# Patient Record
Sex: FEMALE | Race: WHITE
[De-identification: names, ages, dates, MRNs, and addresses within clinical notes are randomized per-mention and may not be internally consistent; named-entity substitution may affect disease eponyms.]

---

## 2022-10-17 PROBLEM — Z00.00 ENCOUNTER FOR PREVENTIVE HEALTH EXAMINATION: Status: ACTIVE | Noted: 2022-10-17

## 2022-11-16 ENCOUNTER — APPOINTMENT (OUTPATIENT)
Dept: GERIATRICS | Facility: CLINIC | Age: 87
End: 2022-11-16

## 2022-11-16 VITALS
RESPIRATION RATE: 18 BRPM | HEART RATE: 79 BPM | BODY MASS INDEX: 33.96 KG/M2 | SYSTOLIC BLOOD PRESSURE: 160 MMHG | WEIGHT: 173 LBS | DIASTOLIC BLOOD PRESSURE: 98 MMHG | HEIGHT: 60 IN | TEMPERATURE: 97.7 F | OXYGEN SATURATION: 93 %
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
## 2022-11-16 DIAGNOSIS — M25.511 PAIN IN RIGHT SHOULDER: ICD-10-CM

## 2022-11-16 DIAGNOSIS — Z23 ENCOUNTER FOR IMMUNIZATION: ICD-10-CM
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
## 2022-11-16 DIAGNOSIS — Z87.891 PERSONAL HISTORY OF NICOTINE DEPENDENCE: ICD-10-CM

## 2022-11-16 PROCEDURE — 99205 OFFICE O/P NEW HI 60 MIN: CPT

## 2022-11-16 NOTE — PHYSICAL EXAM
[Healthy Appearing] : healthy appearing [Normal Voice/Communication] : normal voice/communication [Sclera] : the sclera and conjunctiva were normal [EOMI] : extraocular movements were intact [Normal Oral Mucosa] : normal oral mucosa [No Oral Pallor] : no oral pallor [No Oral Cyanosis] : no oral cyanosis [Normal Outer Ear/Nose] : the ears and nose were normal in appearance [Both Tympanic Membranes Were Examined] : both tympanic membranes were normal [Normal Lips/Gums] : the lips and gums were normal [Oropharynx] : the oropharynx was normal [Normal Appearance] : the appearance of the neck was normal [Supple] : the neck was supple [Murmurs] : no murmurs [Edema] : edema was not present [Normal] : normal bowel sounds, non-tender [Cervical Lymph Nodes Enlarged Posterior Bilaterally] : posterior cervical [Supraclavicular Lymph Nodes Enlarged Bilaterally] : supraclavicular [Cervical Lymph Nodes Enlarged Anterior Bilaterally] : anterior cervical, supraclavicular [No Spinal Tenderness] : no spinal tenderness [No Clubbing, Cyanosis] : no clubbing or cyanosis of the fingernails [Involuntary Movements] : no involuntary movements were seen [Normal Color / Pigmentation] : normal skin color and pigmentation [No Focal Deficits] : no focal deficits [Normal Affect] : the affect was normal [Normal Mood] : the mood was normal [de-identified] : right shoulder with pain with active extensionabove 40 degrees

## 2022-11-16 NOTE — HISTORY OF PRESENT ILLNESS
[One fall no injury in past year] : Patient reported one fall in the past year without injury [FreeTextEntry1] : 89yoM with CKDIV, unsteady gait, OA of knees, seen as new patient for geriatric consultation accompanied by her son Saran.\par \par functional status:\par fell out of bed onto right side last night with right shoulder pain on deltoid area, non radiating, mild, but worse with raising her arm\par walks with cane, or walker for longer distances\par \par she states she notcies that her memory has been getting worse, she can't remember things\par she has no history of cardiac or vascular disease.  she has no family history of dementia.\par active in the community\par independent with ADLs\par needs assistance with IADL's\par \par OA of knees\par takes Tylenol PRN\par \par takes no medication regularly\par \par wears glasses\par no hearing aides\par \par retired psychiatric nurse\par \par HCP:\par Saran Seay  son \par (has 2 sons)\par \par Lives in [facility] in [place]  [facility] in 10/20[place]\par goes to senior center few times\par does daily exercise class at [facility]\par \par denies sleeping disturbances\par \par occasional diarrhea\par denies UI\par \par had recent blood work 2-3 weeks ago\par \par got flu shot 2022\par

## 2022-11-16 NOTE — ASSESSMENT
[FreeTextEntry1] : Fall at home:\par right shoulder pain:\par unsteady gait:\par fall precautions discussed\par continue with daily exercise\par PRN tylenol, or topical pain creams\par continue with cane use\par if not improving or has limited ROM, recommend starting PT\par \par mild dementia:\par moca 19/30\par start aricept 5mg daily\par counselled on medication adherence, pill box, options within [facility] if assistance is needed\par cognitive stimulating activities, socialization, and exercise\par follow up with [facility] physician if previous imaging and additional lab work performed\par f/u 3months\par \par CKD IV:\par scr 1.73 \par no anemia 9/15/22\par avoid nephrotoxins and NSAIDs\par they will follow up if patient had US kidneys done with [facility] physician\par \par \par \par \par \par \par

## 2022-11-16 NOTE — REVIEW OF SYSTEMS
[Eyesight Problems] : eyesight problems [SOB on Exertion] : shortness of breath during exertion [As Noted in HPI] : as noted in HPI [Negative] : Heme/Lymph [Cough] : no cough

## 2023-01-04 ENCOUNTER — APPOINTMENT (OUTPATIENT)
Dept: GERIATRICS | Facility: CLINIC | Age: 88
End: 2023-01-04
Payer: MEDICARE

## 2023-01-04 ENCOUNTER — NON-APPOINTMENT (OUTPATIENT)
Age: 88
End: 2023-01-04

## 2023-01-04 VITALS
DIASTOLIC BLOOD PRESSURE: 64 MMHG | OXYGEN SATURATION: 96 % | WEIGHT: 176 LBS | SYSTOLIC BLOOD PRESSURE: 120 MMHG | HEIGHT: 60 IN | BODY MASS INDEX: 34.55 KG/M2 | HEART RATE: 69 BPM | RESPIRATION RATE: 14 BRPM | TEMPERATURE: 97.9 F

## 2023-01-04 DIAGNOSIS — R73.9 HYPERGLYCEMIA, UNSPECIFIED: ICD-10-CM

## 2023-01-04 PROCEDURE — 99214 OFFICE O/P EST MOD 30 MIN: CPT

## 2023-01-04 NOTE — HISTORY OF PRESENT ILLNESS
[FreeTextEntry1] : 89yoM with CKDIV, unsteady gait, OA of knees, seen for follow up accompanied by her son Saran.\par \par they are asking whether she needs metoprolol\par \par was placed on medication by prison doctor for elevated BP.\par but she has not been taking\par denies cardiac hx, palpications, cp, sob.\par she takes no medications regularly\par family is asking whether she really needs medication\par \par bp today is controlled on no medication\par \par ckd: denies previous imaging\par denies hx of stones, denies bleeding

## 2023-01-04 NOTE — ASSESSMENT
[FreeTextEntry1] : ckd: check labwork , renal US\par glucose 131 on prior labwork: check a1c\par Blood pressure controlled today-  advised no need for metoprolol for now, repeat bp in 1 month\par counselled on limiting salt intake\par mild dementia: continue to monitor off medications at this time\par \par f/u 1 month for BP, labwork, imaging

## 2023-01-04 NOTE — REVIEW OF SYSTEMS
[Fever] : no fever [As Noted in HPI] : as noted in HPI [Negative] : Neurological [FreeTextEntry9] : oa knees

## 2023-01-04 NOTE — PHYSICAL EXAM
[Normal Appearance] : the appearance of the neck was normal [No Clubbing, Cyanosis] : no clubbing or cyanosis of the fingernails [Involuntary Movements] : no involuntary movements were seen [Normal] : normal affect and normal mood [de-identified] : trace edema

## 2023-01-11 ENCOUNTER — LABORATORY RESULT (OUTPATIENT)
Age: 88
End: 2023-01-11

## 2023-01-13 ENCOUNTER — OUTPATIENT (OUTPATIENT)
Dept: OUTPATIENT SERVICES | Facility: HOSPITAL | Age: 88
LOS: 1 days | End: 2023-01-13
Payer: COMMERCIAL

## 2023-01-13 ENCOUNTER — TRANSCRIPTION ENCOUNTER (OUTPATIENT)
Age: 88
End: 2023-01-13

## 2023-01-13 ENCOUNTER — APPOINTMENT (OUTPATIENT)
Dept: ULTRASOUND IMAGING | Facility: HOSPITAL | Age: 88
End: 2023-01-13
Payer: MEDICARE

## 2023-01-13 DIAGNOSIS — N18.4 CHRONIC KIDNEY DISEASE, STAGE 4 (SEVERE): ICD-10-CM

## 2023-01-13 PROCEDURE — 76775 US EXAM ABDO BACK WALL LIM: CPT

## 2023-01-13 PROCEDURE — 76775 US EXAM ABDO BACK WALL LIM: CPT | Mod: 26

## 2023-01-16 ENCOUNTER — LABORATORY RESULT (OUTPATIENT)
Age: 88
End: 2023-01-16

## 2023-01-30 ENCOUNTER — APPOINTMENT (OUTPATIENT)
Dept: GERIATRICS | Facility: CLINIC | Age: 88
End: 2023-01-30
Payer: MEDICARE

## 2023-01-30 VITALS
BODY MASS INDEX: 34.16 KG/M2 | OXYGEN SATURATION: 98 % | SYSTOLIC BLOOD PRESSURE: 140 MMHG | WEIGHT: 174 LBS | HEART RATE: 81 BPM | HEIGHT: 60 IN | TEMPERATURE: 98.4 F | DIASTOLIC BLOOD PRESSURE: 78 MMHG | RESPIRATION RATE: 18 BRPM

## 2023-01-30 PROCEDURE — 99214 OFFICE O/P EST MOD 30 MIN: CPT

## 2023-01-30 NOTE — HISTORY OF PRESENT ILLNESS
[FreeTextEntry1] : 89yoM with CKDIV, unsteady gait, OA of knees, seen for follow up accompanied by her son Saran.\par \par labwork showing newly dx dm2\par states she eats a lot of candy and a sugary drinks\par \par bp today controlled\par \par she is without acute complaints\par \par notes trouble with her memory, which frustrates her\par she enjoys doing puzzles\par \par unsteady gait: uses cane or walker\par denies fall since last visit\par currently not working with PT\par \par

## 2023-01-30 NOTE — ASSESSMENT
[FreeTextEntry1] : CKDIV: renal US reviewed with patient\par baseline Cr ~1.7, no anemia\par discussed avoiding nephrotoxins\par \par DM2: newly diagnosed\par diet discussed\par no medication at this time\par \par vitamin b12 def:\par start B12 supplementation with injections x4 once weekly\par (they call to make apointment as she moving in with her son in a few weeks)\par \par mild Dementia:\par counselled on socialization, cognitive stimulating activities\par monitor off medications at this time\par MOCA 19/30 11/2022\par re-address starting aricept once patient is living with son\par \par unsteady gait: c/w cane and walker use

## 2023-01-30 NOTE — PHYSICAL EXAM
[Sclera] : the sclera and conjunctiva were normal [EOMI] : extraocular movements were intact [Normal Outer Ear/Nose] : the ears and nose were normal in appearance [Normal Appearance] : the appearance of the neck was normal [Normal] : no respiratory distress, no accessory muscle use, normal respiratory rhythm and effort, lungs were clear to auscultation bilaterally [Normal S1, S2] : normal S1 and S2 [Heart Rate And Rhythm] : heart rate was normal and rhythm regular [Edema] : edema was not present [Normal Color / Pigmentation] : normal skin color and pigmentation [No Focal Deficits] : no focal deficits [Normal Affect] : the affect was normal [Normal Mood] : the mood was normal [de-identified] : cane

## 2023-02-03 ENCOUNTER — APPOINTMENT (OUTPATIENT)
Dept: GERIATRICS | Facility: CLINIC | Age: 88
End: 2023-02-03

## 2023-03-06 ENCOUNTER — APPOINTMENT (OUTPATIENT)
Dept: GERIATRICS | Facility: CLINIC | Age: 88
End: 2023-03-06
Payer: MEDICARE

## 2023-03-06 PROCEDURE — 96372 THER/PROPH/DIAG INJ SC/IM: CPT

## 2023-03-06 RX ORDER — CYANOCOBALAMIN 1000 UG/ML
1000 INJECTION INTRAMUSCULAR; SUBCUTANEOUS
Qty: 0 | Refills: 0 | Status: COMPLETED | OUTPATIENT
Start: 2023-03-06

## 2023-03-06 RX ADMIN — CYANOCOBALAMIN 1 MCG/ML: 1000 INJECTION INTRAMUSCULAR; SUBCUTANEOUS at 00:00

## 2023-03-13 ENCOUNTER — APPOINTMENT (OUTPATIENT)
Dept: GERIATRICS | Facility: CLINIC | Age: 88
End: 2023-03-13
Payer: MEDICARE

## 2023-03-13 PROCEDURE — 96372 THER/PROPH/DIAG INJ SC/IM: CPT

## 2023-03-13 RX ORDER — CYANOCOBALAMIN 1000 UG/ML
1000 INJECTION INTRAMUSCULAR; SUBCUTANEOUS
Qty: 0 | Refills: 0 | Status: COMPLETED | OUTPATIENT
Start: 2023-03-13

## 2023-03-13 RX ADMIN — CYANOCOBALAMIN 0 MCG/ML: 1000 INJECTION INTRAMUSCULAR; SUBCUTANEOUS at 00:00

## 2023-03-20 ENCOUNTER — APPOINTMENT (OUTPATIENT)
Dept: GERIATRICS | Facility: CLINIC | Age: 88
End: 2023-03-20
Payer: MEDICARE

## 2023-03-20 PROCEDURE — 96372 THER/PROPH/DIAG INJ SC/IM: CPT

## 2023-03-20 RX ORDER — CYANOCOBALAMIN 1000 UG/ML
1000 INJECTION INTRAMUSCULAR; SUBCUTANEOUS
Qty: 0 | Refills: 0 | Status: COMPLETED | OUTPATIENT
Start: 2023-03-20

## 2023-03-20 RX ADMIN — CYANOCOBALAMIN 1 MCG/ML: 1000 INJECTION INTRAMUSCULAR; SUBCUTANEOUS at 00:00

## 2023-03-27 ENCOUNTER — APPOINTMENT (OUTPATIENT)
Dept: GERIATRICS | Facility: CLINIC | Age: 88
End: 2023-03-27
Payer: MEDICARE

## 2023-03-27 PROCEDURE — 96372 THER/PROPH/DIAG INJ SC/IM: CPT

## 2023-03-27 RX ADMIN — CYANOCOBALAMIN 0 MCG/ML: 1000 INJECTION INTRAMUSCULAR; SUBCUTANEOUS at 00:00

## 2023-03-31 ENCOUNTER — LABORATORY RESULT (OUTPATIENT)
Age: 88
End: 2023-03-31

## 2023-04-11 LAB
FOLATE SERPL-MCNC: 11.8 NG/ML
VIT B12 SERPL-MCNC: 873 PG/ML

## 2023-04-26 ENCOUNTER — APPOINTMENT (OUTPATIENT)
Dept: GERIATRICS | Facility: CLINIC | Age: 88
End: 2023-04-26
Payer: MEDICARE

## 2023-04-26 VITALS
OXYGEN SATURATION: 98 % | TEMPERATURE: 97.8 F | BODY MASS INDEX: 32.39 KG/M2 | WEIGHT: 165 LBS | RESPIRATION RATE: 16 BRPM | HEART RATE: 66 BPM | DIASTOLIC BLOOD PRESSURE: 64 MMHG | SYSTOLIC BLOOD PRESSURE: 130 MMHG | HEIGHT: 60 IN

## 2023-04-26 DIAGNOSIS — R26.81 UNSTEADINESS ON FEET: ICD-10-CM

## 2023-04-26 DIAGNOSIS — N18.4 CHRONIC KIDNEY DISEASE, STAGE 4 (SEVERE): ICD-10-CM

## 2023-04-26 DIAGNOSIS — F03.A0 UNSPECIFIED DEMENTIA, MILD, WITHOUT BEHAVIORAL DISTURBANCE, PSYCHOTIC DISTURBANCE, MOOD DISTURBANCE, AND ANXIETY: ICD-10-CM

## 2023-04-26 DIAGNOSIS — E11.9 TYPE 2 DIABETES MELLITUS W/OUT COMPLICATIONS: ICD-10-CM

## 2023-04-26 DIAGNOSIS — E53.8 DEFICIENCY OF OTHER SPECIFIED B GROUP VITAMINS: ICD-10-CM

## 2023-04-26 PROCEDURE — 99214 OFFICE O/P EST MOD 30 MIN: CPT

## 2023-04-26 RX ORDER — DONEPEZIL HYDROCHLORIDE 5 MG/1
5 TABLET ORAL
Qty: 90 | Refills: 1 | Status: ACTIVE | COMMUNITY
Start: 2023-04-26 | End: 1900-01-01

## 2023-04-26 NOTE — ASSESSMENT
[FreeTextEntry1] : Mild dementia:\par 11/2022 MOCA 19/30\par trial of aricept 5mg qhs\par needs assistance with IADL's and some ADL\par c/w senior center \par \par ckd: stable, continue to monitor\par \par dm2: diet controlled\par \par unsteady gait: fall prevention \par \par vit b12 def:\par start daily b12 oral pill

## 2023-04-26 NOTE — PHYSICAL EXAM
[Normal Appearance] : the appearance of the neck was normal [Edema] : edema was not present [No Clubbing, Cyanosis] : no clubbing or cyanosis of the fingernails [Involuntary Movements] : no involuntary movements were seen [Normal] : normal affect and normal mood [de-identified] : able to stand from chair with use of cane [de-identified] : hyperkeratotic lesion on nose

## 2023-04-26 NOTE — REVIEW OF SYSTEMS
[Fever] : no fever [Skin Lesions] : skin lesion [As Noted in HPI] : as noted in HPI [Negative] : Heme/Lymph [FreeTextEntry9] : oa knees

## 2023-04-26 NOTE — HISTORY OF PRESENT ILLNESS
[FreeTextEntry1] : \par 89yoM with CKDIV, unsteady gait, OA of knees, seen for follow up accompanied by her son Saran.\par \par cognitive impairment:\par [place] 11/2022 19/30 c/w mild Dementia\par \par needs assistance with medications and all IADL's.\par \par showers by herself, but getting more difficult.  has shower chair\par dresses self, aside from shoes and socks.\par toliets self.  denies urinary or fecal incontinence\par \par \par unsteady gait: \par uses cane or walker\par denies fall since last visit\par currently not working with PT\par unable to stand prolonged time\par unable to walk long distances due to pain and weakness\par limited functional capability\par poor balance\par \par going to senior center twice weekly\par \par sleeping well, but some nights with difficulty sleeping,\par then she likes to sleep in later in the morning\par \par knitting blankets\par \par now living with son Saran\par \par wears glasses\par no hearing aides\par \par hyperkeratotic lesion on nose\par retired psychiatric nurse\par \par HCP:\par Saran Seay son \par (has 2 sons)\par \par

## 2023-06-02 ENCOUNTER — EMERGENCY (EMERGENCY)
Facility: HOSPITAL | Age: 88
LOS: 1 days | Discharge: HOME | End: 2023-06-02
Attending: EMERGENCY MEDICINE | Admitting: EMERGENCY MEDICINE
Payer: COMMERCIAL

## 2023-06-02 VITALS
RESPIRATION RATE: 18 BRPM | DIASTOLIC BLOOD PRESSURE: 89 MMHG | SYSTOLIC BLOOD PRESSURE: 170 MMHG | HEART RATE: 85 BPM | OXYGEN SATURATION: 95 %

## 2023-06-02 VITALS
TEMPERATURE: 98 F | DIASTOLIC BLOOD PRESSURE: 90 MMHG | RESPIRATION RATE: 18 BRPM | SYSTOLIC BLOOD PRESSURE: 141 MMHG | HEART RATE: 79 BPM | WEIGHT: 125 LBS | OXYGEN SATURATION: 94 %

## 2023-06-02 PROCEDURE — 72100 X-RAY EXAM L-S SPINE 2/3 VWS: CPT | Mod: 26

## 2023-06-02 PROCEDURE — 73562 X-RAY EXAM OF KNEE 3: CPT | Mod: 26,50

## 2023-06-02 PROCEDURE — 72100 X-RAY EXAM L-S SPINE 2/3 VWS: CPT

## 2023-06-02 PROCEDURE — 99285 EMERGENCY DEPT VISIT HI MDM: CPT

## 2023-06-02 PROCEDURE — 70450 CT HEAD/BRAIN W/O DYE: CPT | Mod: MA

## 2023-06-02 PROCEDURE — 72125 CT NECK SPINE W/O DYE: CPT | Mod: MA

## 2023-06-02 PROCEDURE — 70450 CT HEAD/BRAIN W/O DYE: CPT | Mod: 26,MA

## 2023-06-02 PROCEDURE — 99284 EMERGENCY DEPT VISIT MOD MDM: CPT | Mod: 25

## 2023-06-02 PROCEDURE — 72125 CT NECK SPINE W/O DYE: CPT | Mod: 26,MA

## 2023-06-02 PROCEDURE — 73562 X-RAY EXAM OF KNEE 3: CPT

## 2023-06-02 RX ORDER — ACETAMINOPHEN 500 MG
650 TABLET ORAL ONCE
Refills: 0 | Status: COMPLETED | OUTPATIENT
Start: 2023-06-02 | End: 2023-06-02

## 2023-06-02 RX ADMIN — Medication 650 MILLIGRAM(S): at 13:13

## 2023-06-02 RX ADMIN — Medication 650 MILLIGRAM(S): at 12:13

## 2023-06-02 NOTE — ED ADULT NURSE NOTE - NSICDXPASTSURGICALHX_GEN_ALL_CORE_FT
PAST SURGICAL HISTORY:  Breast cyst left- 1966    Cataract of left eye     History of hysterectomy colporraphy- 1972

## 2023-06-02 NOTE — ED ADULT NURSE NOTE - OBJECTIVE STATEMENT
Patient brought in by EMS with complaint of mechanical fall when going to restroom. Patient complaint of B/L knee pain. Patient denies hitting her head, nausea, vomit or syncopal episode. Patient uses two canes to walk around in the house. Denies taking any blood thinners.

## 2023-06-02 NOTE — ED ADULT TRIAGE NOTE - TEMPERATURE IN CELSIUS (DEGREES C)
Writer spoke with mom and explained what immunizations are do and why. Mom verbalized understanding and will call for a nurse visit.    36.7

## 2023-06-02 NOTE — ED ADULT NURSE NOTE - NSFALLHARMRISKINTERV_ED_ALL_ED

## 2023-06-02 NOTE — ED ADULT NURSE NOTE - NSICDXPASTMEDICALHX_GEN_ALL_CORE_FT
PAST MEDICAL HISTORY:  Arthritis of Knee bilateral    Diabetes mellitus     HTN - Hypertension     Hx of obesity     Hypercholesterolemia     Renal insufficiency

## 2023-06-02 NOTE — ED PROVIDER NOTE - NSFOLLOWUPINSTRUCTIONS_ED_ALL_ED_FT
Contusion    A contusion is a deep bruise. Contusions are the result of a blunt injury to tissues and muscle fibers under the skin. The skin overlying the contusion may turn blue, purple, or yellow. Symptoms also include pain and swelling in the injured area. In the Emergency Department today, we did not appreciate any abnormal findings on your xray. We will submit to our radiologist for FINAL review. If there are any new findings or concerning findings that were missed in the Emergency Department, we will notify you at the number provided upon registration. CT scans were normal.     SEEK IMMEDIATE MEDICAL CARE IF YOU HAVE ANY OF THE FOLLOWING SYMPTOMS: severe pain, numbness, tingling, pain, weakness, or skin color/temperature change in any part of your body distal to the injury.

## 2023-06-02 NOTE — ED PROVIDER NOTE - OBJECTIVE STATEMENT
89-year-old female presents to the ED after mechanical fall.  She walks with 2 canes.  She prefers not to use a walker because he gets in her way.  Patient states that she was shifting and then she tripped over her own feet and fell.  Patient denies head injury.  However complains of bilateral knee pain and back pain.  No numbness or tingling.  No LOC reported.  No vomiting.  She lives alone and contacted emergency services by using her med alert.

## 2023-06-02 NOTE — ED PROVIDER NOTE - PATIENT PORTAL LINK FT
You can access the FollowMyHealth Patient Portal offered by Health system by registering at the following website: [email or web address]. By joining [facility]’s FollowMyHealth portal, you will also be able to view your health information using other applications (apps) compatible with our system.

## 2023-06-02 NOTE — ED PROVIDER NOTE - CLINICAL SUMMARY MEDICAL DECISION MAKING FREE TEXT BOX
89-year-old female presents to the ED after mechanical fall.  She walks with 2 canes.  She prefers not to use a walker because he gets in her way.  Patient states that she was shifting and then she tripped over her own feet and fell.  Patient denies head injury.  However complains of bilateral knee pain and back pain.  No numbness or tingling.  No LOC reported.  No vomiting.  She lives alone and contacted emergency services by using her med alert.  Exam as stated. Plan for xray knees and lumbar spine/ CT brain and CSpine since elder fall, unwitnessed. 89-year-old female presents to the ED after mechanical fall.  She walks with 2 canes.  She prefers not to use a walker because he gets in her way.  Patient states that she was shifting and then she tripped over her own feet and fell.  Patient denies head injury.  However complains of bilateral knee pain and back pain.  No numbness or tingling.  No LOC reported.  No vomiting.  She lives alone and contacted emergency services by using her med alert.  Exam as stated. Plan for xray knees and lumbar spine/ CT brain and C-Spine since elder fall, unwitnessed.    Results WNL. Pt ambulating well in ED. Son at bedside. He lives with her but stepped out for a moment when this occurred. Stable for dc. Worsening, continued or ANY new concerning symptoms return to the emergency department.

## 2023-06-06 NOTE — CHART NOTE - NSCHARTNOTEFT_GEN_A_CORE
SW placed call to patient to discuss and assist with follow up care.  Patient presented to ED on 6/2/23 due to fall.  Patient reports her son will be scheduling follow up with Dr Kaur and declined needing SW assistance at this time.

## 2023-08-01 RX ORDER — CYANOCOBALAMIN 1000 UG/ML
1000 INJECTION INTRAMUSCULAR; SUBCUTANEOUS
Qty: 1 | Refills: 0 | Status: COMPLETED | OUTPATIENT
Start: 2023-03-27

## 2025-01-29 NOTE — ED PROVIDER NOTE - NSCAREINITIATED _GEN_ER
Testing  None        Treatment  Continue Flonase 1 squirt each nostril every day      Continue Wixela (generic for Advair):  1puff twice a day at least until Girma Gras    After Girma Gras, can reduce to 1 puff daily.  If increase in wheezing, stop    OK to restart if needed.  Will likely restart next fall.        Follow up    Expect worsening nasal symptoms in late Febrauary to March from oak pollen.    Follow up with me as needed or not later than November 2025     Ester Castro(Attending)
